# Patient Record
(demographics unavailable — no encounter records)

---

## 2025-02-03 NOTE — HISTORY OF PRESENT ILLNESS
[FreeTextEntry1] : Dear MILADY Collier  Thank you so much for the referral to help care for your patient.  Chief Complaint: Prostate Cancer Date of first visit: 11/07/2022  MARYAN FUCHS is a 74 year old /[place] man with PMHx HLD, HTN, preDM who presents for newly diagnosed prostate cancer. The biopsy was positive for GG2-GG1 cancer. The MRI review demonstrated an area that correlates with the known pathology. However, it was not a discrete lesion. We targeted the area for the prostate biopsy with the UroNav MRI fusion on 5/23/24. The biopsy was POSITIVE for GG1 and GG2 prostate cancer.  His PSA is 7.57 ng/ml. Denies family hx of  malignancies. Maternal grandmother with breast cancer.  Decipher reported on 02/15/2023 - score: 0.30, LOW risk Decipher 0.09 (low) June 2024  PSA Hx: 8.98 06/21/2024 9.60 03/12/2024 7.70 09/12/2023 8.04 11/21/22 7.57 11/07/22 PSAD 8.2 09/23/22 7.03 09/07/22  CT Hx: 8/19/2019 CT ABD-PELV at [facility]. Multiple simple hepatic cysts measuring up to 4.7 cm in size. A 1.3 cm cyst in the uncinate process is incompletely characterized. There is a 2.4 cm left renal cyst. Colonic diverticulosis. Prominent prostate mildly indenting the floor the bladder. There is a 1 cm bladder diverticulum can be associated with chronic outlet obstruction.  MRI Hx: MRI at [facility] on 01/09/2024. 71 cc prostate with PIRADS 2, No new MRI targetable lesions. Previous left midgland PZpl lesion is less conspicuous. No LAD No EPE, No Bony Lesions. The images have been reviewed and clinical implications discussed with the patient. (ADDED RIGHT MID PZPL)  MRI at [facility] on 11/16/2022. 60cc prostate with PIRADS 4 lesion measuring 7x8x9 mm, left mid pzpl (image #19). No LAD No EPE, No Bony Lesions. The images have been reviewed and clinical implications discussed with the patient. On review, lesion seen at R mid TZp (image#19).  Biopsy Hx: 05/23/2024 with - -Right anterior apex: Small focus of atypical glands. -Right anterior base:(McLaughlin Score 3+3=6) involving 30% of biopsy material. -Midline base: High grade prostatic intraepithelial neoplasia (HGPIN). -Left posterior apex: Small focus of atypical glands. -Right apex base PZ: (Edward Score 3+3=6) involving 15% of biopsy material. -Right mid PZPL:(Edward Score 3+4=7) involving 40%, 40% of biopsy material.  1/19/23 with Dr Tan The standard biopsy detected GG2-GG1 cancer 2/12 cores (GG2 RPA 30% 2.5mm 10% pattern 4. GG1 RPB 5% 0.5mm).  02/11/2024 IPSS    QOL ALEXANDER  09/12/2023 IPSS 9 QOL 0 ALEXANDER 18  03/01/2023 IPSS 3 QOL 0 ALEXANDER 22  11/07/2022 IPSS 5 QOL 0 ALEXANDER 20  The patient denies fevers, chills, nausea and or vomiting and no unexplained weight loss.  All pertinent laboratory, films and physician notes were reviewed. Questionnaire results were discussed with patient.  Discussion with the patient: Risks/Benefits/FDA recommendations for prostate cancer screening, the natural history of prostate cancer, the concept of "competing risks", options for treatment including active surveillance, open and laparoscopic (Robotic) radical prostatectomy, external beam radiation therapy, interstitial brachytherapy ("seeds"), combined therapies, hormonal therapies, orchiectomy, and cryotherapy. The potential side effects, early and delayed risks, and effectiveness of each treatment was discussed. The specific details of this patient's disease and their bearing on the above were discussed. The patient was offered the opportunity to meet with the Radiation Oncologists. The patient asked appropriate contextual questions, indicating a good level of understanding.  We discussed the surgical options for management, including robotic and open prostatectomy. The patient understands that the risks of these procedures include bleeding infection, damage to the rectum and surrounding organs, and ED and urinary incontinence, both of which may be persistent. The advantages include removing the entire prostate for more accurate pathologic staging. There is more blood loss with the open approach than with the laparoscopic approach.  We also discussed the options of radiation (external beam, stereotactic body radiation therapy, brachytherapy or combination therapies). Definitive brachytherapy at our institution is done with Pd-103 either as monotherapy or in combination with external beam radiation (Dependent on risk). The patient understands that the risks of radiation include increased LUTS, diarrhea, hematuria, difficulty urinating, ED, and urinary frequency. Based upon the patient's PSA/pathology and risk stratification, the patient may need androgen deprivation in the neoadjuvant and adjuvant setting if he chooses a radiation monotherapy.  We also discussed active surveillance. The patient understands that this is not a treatment but a way of monitoring potentially indolent disease. The patient understands that this has minimal side effects but there is a risk of disease progression.  We have discussed the use of focal therapy and cancer control. The goal be to treat the high-risk area and follow the low-risk areas which are not clinically significant. I describe the difference between whole gland therapy and focal therapy with respect to cancerous control versus cancer cure. We talked about the risks of focal therapy damage to adjacent structures; however, the probability of urinary incontinence and erectile dysfunction is lower.  The patient and I discussed all of these options as well as their risks and benefits, and I believe I answered his questions. Based on the patient's disease characteristics, I feel the patient would be a good candidate for WG radio-therapy The patient decided to see Wilfredo Nicole in [facility] - he really liked her. but is sad he does not get to see her again because he does not want radiation at this time and will continue with AS.

## 2025-02-03 NOTE — PHYSICAL EXAM
[FreeTextEntry1] : 75 yo male with elevated PSA 9.6 ng/ml, no prior imaging, biopsy naive, neg FH, CHRISTIAN with L sided firmness/no discrete nodules. The MRI 2024 I reported additional lesion for sampling - all information in dyancad.  Prostate Cancer (GG2) - T1c, 2 cores(+), no MR correlate, detected on standard biopsy - Right posterior apex (3+4=7, 30% 2.5mm) and right posterior base (3+3=6, 5% 0.5mm) (2023 BIOPSY) 2024 Biopsy - MRI correlate with new lesion reported by MARY ALTAMIRANO with focal GG1 as well.  discussion regarding treatment options consultation with Dr. Mcfarlane. love her but is going with AS DECIPHER 0.09 low risk 6/7/24  [facility] Active Surveillance Protocol  CHRISTIAN Enrollment then Q3 years  PSA Q6 months x5 years THEN annually  MRI Q12-24 months x5 years, then Q3 years  Biopsy Stable Visible Lesion: Biopsy at month 12, 48 then Q3-5 years or For Cause  No Visible Lesion: Biopsy at month 12, then Q3-5 years if MRI remains stable   TRIGGERS for FOR-CAUSE BIOPSY  New nodule on CHRISTIAN  Rising PSA x3 and PSADT <3 years  MRI with lesion progression (PRECISE 4-5)  Increase in PIRADS score or new PIRADS 3-5 lesion  SVI, LVI, KENNETH, LAD  Increase in lesion size   Treatment Consideration  Pathologic progression GG1?GG2 or GG2?GG3  Cancer in >50% of cores  Any GG3 Disease  Anxiety  Non-compliance  Patient preference  Thank you very much for allowing me to assist in the care of this patient. Please do not hesitate to contact me with any additional questions or concerns.

## 2025-02-04 NOTE — HISTORY OF PRESENT ILLNESS
[FreeTextEntry1] : Dear MILADY Collier  Thank you so much for the referral to help care for your patient.  Chief Complaint: Prostate Cancer Date of first visit: 11/07/2022  MARYAN FUCHS is a 74 year old /[place]er man with PMHx HLD, HTN, preDM who presents for newly diagnosed prostate cancer. The biopsy was positive for GG2-GG1 cancer. The MRI review demonstrated an area that correlates with the known pathology. However, it was not a discrete lesion. We targeted the area for the prostate biopsy with the UroNav MRI fusion on 5/23/24. The biopsy was POSITIVE for GG1 and GG2 prostate cancer.  His PSA is 7.57 ng/ml. Denies family hx of  malignancies. Maternal grandmother with breast cancer.  Decipher reported on 02/15/2023 - score: 0.30, LOW risk Decipher 0.09 (low) June 2024  PSA Hx: 8.98 06/21/2024 9.60 03/12/2024 7.70 09/12/2023 8.04 11/21/22 7.57 11/07/22 PSAD 8.2 09/23/22 7.03 09/07/22  CT Hx: 8/19/2019 CT ABD-PELV at [facility]. Multiple simple hepatic cysts measuring up to 4.7 cm in size. A 1.3 cm cyst in the uncinate process is incompletely characterized. There is a 2.4 cm left renal cyst. Colonic diverticulosis. Prominent prostate mildly indenting the floor the bladder. There is a 1 cm bladder diverticulum can be associated with chronic outlet obstruction.  MRI Hx: MRI at [facility] on 01/09/2024. 71 cc prostate with PIRADS 2, No new MRI targetable lesions. Previous left midgland PZpl lesion is less conspicuous. No LAD No EPE, No Bony Lesions. The images have been reviewed and clinical implications discussed with the patient. (ADDED RIGHT MID PZPL)  MRI at [facility] on 11/16/2022. 60cc prostate with PIRADS 4 lesion measuring 7x8x9 mm, left mid pzpl (image #19). No LAD No EPE, No Bony Lesions. The images have been reviewed and clinical implications discussed with the patient. On review, lesion seen at R mid TZp (image#19).  Biopsy Hx: 05/23/2024 with - -Right anterior apex: Small focus of atypical glands. -Right anterior base:(Vincent Score 3+3=6) involving 30% of biopsy material. -Midline base: High grade prostatic intraepithelial neoplasia (HGPIN). -Left posterior apex: Small focus of atypical glands. -Right apex base PZ: (Edward Score 3+3=6) involving 15% of biopsy material. -Right mid PZPL:(Edward Score 3+4=7) involving 40%, 40% of biopsy material.  1/19/23 with Dr Tan The standard biopsy detected GG2-GG1 cancer 2/12 cores (GG2 RPA 30% 2.5mm 10% pattern 4. GG1 RPB 5% 0.5mm).  02/11/2024 IPSS    QOL ALEXANDER  09/12/2023 IPSS 9 QOL 0 ALEXANDER 18  03/01/2023 IPSS 3 QOL 0 ALEXANDER 22  11/07/2022 IPSS 5 QOL 0 ALEXANDER 20  The patient denies fevers, chills, nausea and or vomiting and no unexplained weight loss.  All pertinent laboratory, films and physician notes were reviewed. Questionnaire results were discussed with patient.  Discussion with the patient: Risks/Benefits/FDA recommendations for prostate cancer screening, the natural history of prostate cancer, the concept of "competing risks", options for treatment including active surveillance, open and laparoscopic (Robotic) radical prostatectomy, external beam radiation therapy, interstitial brachytherapy ("seeds"), combined therapies, hormonal therapies, orchiectomy, and cryotherapy. The potential side effects, early and delayed risks, and effectiveness of each treatment was discussed. The specific details of this patient's disease and their bearing on the above were discussed. The patient was offered the opportunity to meet with the Radiation Oncologists. The patient asked appropriate contextual questions, indicating a good level of understanding.  We discussed the surgical options for management, including robotic and open prostatectomy. The patient understands that the risks of these procedures include bleeding infection, damage to the rectum and surrounding organs, and ED and urinary incontinence, both of which may be persistent. The advantages include removing the entire prostate for more accurate pathologic staging. There is more blood loss with the open approach than with the laparoscopic approach.  We also discussed the options of radiation (external beam, stereotactic body radiation therapy, brachytherapy or combination therapies). Definitive brachytherapy at our institution is done with Pd-103 either as monotherapy or in combination with external beam radiation (Dependent on risk). The patient understands that the risks of radiation include increased LUTS, diarrhea, hematuria, difficulty urinating, ED, and urinary frequency. Based upon the patient's PSA/pathology and risk stratification, the patient may need androgen deprivation in the neoadjuvant and adjuvant setting if he chooses a radiation monotherapy.  We also discussed active surveillance. The patient understands that this is not a treatment but a way of monitoring potentially indolent disease. The patient understands that this has minimal side effects but there is a risk of disease progression.  We have discussed the use of focal therapy and cancer control. The goal be to treat the high-risk area and follow the low-risk areas which are not clinically significant. I describe the difference between whole gland therapy and focal therapy with respect to cancerous control versus cancer cure. We talked about the risks of focal therapy damage to adjacent structures; however, the probability of urinary incontinence and erectile dysfunction is lower.  The patient and I discussed all of these options as well as their risks and benefits, and I believe I answered his questions. Based on the patient's disease characteristics, I feel the patient would be a good candidate for WG radio-therapy The patient decided to see Wilfredo Nicole in [facility] - he really liked her. but is sad he does not get to see her again because he does not want radiation at this time and will continue with AS.

## 2025-02-04 NOTE — PHYSICAL EXAM
[FreeTextEntry1] : 73 yo male with elevated PSA 9.6 ng/ml, no prior imaging, biopsy naive, neg FH, CHRISTIAN with L sided firmness/no discrete nodules. The MRI 2024 I reported additional lesion for sampling - all information in dyancad.  Prostate Cancer (GG2) - T1c, 2 cores(+), no MR correlate, detected on standard biopsy - Right posterior apex (3+4=7, 30% 2.5mm) and right posterior base (3+3=6, 5% 0.5mm) (2023 BIOPSY) 2024 Biopsy - MRI correlate with new lesion reported by MARY ALTAMIRANO with focal GG1 as well.  discussion regarding treatment options consultation with Dr. Mcfarlane. love her but is going with AS DECIPHER 0.09 low risk 6/7/24  [facility] Active Surveillance Protocol  CHRISTIAN Enrollment then Q3 years  PSA Q6 months x5 years THEN annually  MRI Q12-24 months x5 years, then Q3 years  Biopsy Stable Visible Lesion: Biopsy at month 12, 48 then Q3-5 years or For Cause  No Visible Lesion: Biopsy at month 12, then Q3-5 years if MRI remains stable   TRIGGERS for FOR-CAUSE BIOPSY  New nodule on CHRISTIAN  Rising PSA x3 and PSADT <3 years  MRI with lesion progression (PRECISE 4-5)  Increase in PIRADS score or new PIRADS 3-5 lesion  SVI, LVI, KENNETH, LAD  Increase in lesion size   Treatment Consideration  Pathologic progression GG1?GG2 or GG2?GG3  Cancer in >50% of cores  Any GG3 Disease  Anxiety  Non-compliance  Patient preference  Thank you very much for allowing me to assist in the care of this patient. Please do not hesitate to contact me with any additional questions or concerns.

## 2025-02-04 NOTE — HISTORY OF PRESENT ILLNESS
[FreeTextEntry1] : Dear MILADY Collier  Thank you so much for the referral to help care for your patient.  Chief Complaint: Prostate Cancer Date of first visit: 11/07/2022  MARYAN FUCHS is a 74 year old /[place] man with PMHx HLD, HTN, preDM who presents for newly diagnosed prostate cancer. The biopsy was positive for GG2-GG1 cancer. The MRI review demonstrated an area that correlates with the known pathology. However, it was not a discrete lesion. We targeted the area for the prostate biopsy with the UroNav MRI fusion on 5/23/24. The biopsy was POSITIVE for GG1 and GG2 prostate cancer.  His PSA is 7.57 ng/ml. Denies family hx of  malignancies. Maternal grandmother with breast cancer.  Decipher reported on 02/15/2023 - score: 0.30, LOW risk Decipher 0.09 (low) June 2024  PSA Hx: 8.98 06/21/2024 9.60 03/12/2024 7.70 09/12/2023 8.04 11/21/22 7.57 11/07/22 PSAD 8.2 09/23/22 7.03 09/07/22  CT Hx: 8/19/2019 CT ABD-PELV at [facility]. Multiple simple hepatic cysts measuring up to 4.7 cm in size. A 1.3 cm cyst in the uncinate process is incompletely characterized. There is a 2.4 cm left renal cyst. Colonic diverticulosis. Prominent prostate mildly indenting the floor the bladder. There is a 1 cm bladder diverticulum can be associated with chronic outlet obstruction.  MRI Hx: MRI at [facility] on 01/09/2024. 71 cc prostate with PIRADS 2, No new MRI targetable lesions. Previous left midgland PZpl lesion is less conspicuous. No LAD No EPE, No Bony Lesions. The images have been reviewed and clinical implications discussed with the patient. (ADDED RIGHT MID PZPL)  MRI at [place] on 11/16/2022. 60cc prostate with PIRADS 4 lesion measuring 7x8x9 mm, left mid pzpl (image #19). No LAD No EPE, No Bony Lesions. The images have been reviewed and clinical implications discussed with the patient. On review, lesion seen at R mid TZp (image#19).  Biopsy Hx: 05/23/2024 with - -Right anterior apex: Small focus of atypical glands. -Right anterior base:(Clayton Score 3+3=6) involving 30% of biopsy material. -Midline base: High grade prostatic intraepithelial neoplasia (HGPIN). -Left posterior apex: Small focus of atypical glands. -Right apex base PZ: (Edward Score 3+3=6) involving 15% of biopsy material. -Right mid PZPL:(Edward Score 3+4=7) involving 40%, 40% of biopsy material.  1/19/23 with Dr Tan The standard biopsy detected GG2-GG1 cancer 2/12 cores (GG2 RPA 30% 2.5mm 10% pattern 4. GG1 RPB 5% 0.5mm).  02/11/2024 IPSS    QOL ALEXANDER  09/12/2023 IPSS 9 QOL 0 ALEXANDER 18  03/01/2023 IPSS 3 QOL 0 ALEXANDER 22  11/07/2022 IPSS 5 QOL 0 ALEXANDER 20  The patient denies fevers, chills, nausea and or vomiting and no unexplained weight loss.  All pertinent laboratory, films and physician notes were reviewed. Questionnaire results were discussed with patient.  Discussion with the patient: Risks/Benefits/FDA recommendations for prostate cancer screening, the natural history of prostate cancer, the concept of "competing risks", options for treatment including active surveillance, open and laparoscopic (Robotic) radical prostatectomy, external beam radiation therapy, interstitial brachytherapy ("seeds"), combined therapies, hormonal therapies, orchiectomy, and cryotherapy. The potential side effects, early and delayed risks, and effectiveness of each treatment was discussed. The specific details of this patient's disease and their bearing on the above were discussed. The patient was offered the opportunity to meet with the Radiation Oncologists. The patient asked appropriate contextual questions, indicating a good level of understanding.  We discussed the surgical options for management, including robotic and open prostatectomy. The patient understands that the risks of these procedures include bleeding infection, damage to the rectum and surrounding organs, and ED and urinary incontinence, both of which may be persistent. The advantages include removing the entire prostate for more accurate pathologic staging. There is more blood loss with the open approach than with the laparoscopic approach.  We also discussed the options of radiation (external beam, stereotactic body radiation therapy, brachytherapy or combination therapies). Definitive brachytherapy at our institution is done with Pd-103 either as monotherapy or in combination with external beam radiation (Dependent on risk). The patient understands that the risks of radiation include increased LUTS, diarrhea, hematuria, difficulty urinating, ED, and urinary frequency. Based upon the patient's PSA/pathology and risk stratification, the patient may need androgen deprivation in the neoadjuvant and adjuvant setting if he chooses a radiation monotherapy.  We also discussed active surveillance. The patient understands that this is not a treatment but a way of monitoring potentially indolent disease. The patient understands that this has minimal side effects but there is a risk of disease progression.  We have discussed the use of focal therapy and cancer control. The goal be to treat the high-risk area and follow the low-risk areas which are not clinically significant. I describe the difference between whole gland therapy and focal therapy with respect to cancerous control versus cancer cure. We talked about the risks of focal therapy damage to adjacent structures; however, the probability of urinary incontinence and erectile dysfunction is lower.  The patient and I discussed all of these options as well as their risks and benefits, and I believe I answered his questions. Based on the patient's disease characteristics, I feel the patient would be a good candidate for WG radio-therapy The patient decided to see Wilfredo Nicole in [facility] - he really liked her. but is sad he does not get to see her again because he does not want radiation at this time and will continue with AS.

## 2025-02-04 NOTE — PHYSICAL EXAM
[FreeTextEntry1] : 75 yo male with elevated PSA 9.6 ng/ml, no prior imaging, biopsy naive, neg FH, CHRISTIAN with L sided firmness/no discrete nodules. The MRI 2024 I reported additional lesion for sampling - all information in dyancad.  Prostate Cancer (GG2) - T1c, 2 cores(+), no MR correlate, detected on standard biopsy - Right posterior apex (3+4=7, 30% 2.5mm) and right posterior base (3+3=6, 5% 0.5mm) (2023 BIOPSY) 2024 Biopsy - MRI correlate with new lesion reported by MARY ALTAMIRANO with focal GG1 as well.  discussion regarding treatment options consultation with Dr. Mcfarlane. love her but is going with AS DECIPHER 0.09 low risk 6/7/24  [facility] Active Surveillance Protocol  CHRISTIAN Enrollment then Q3 years  PSA Q6 months x5 years THEN annually  MRI Q12-24 months x5 years, then Q3 years  Biopsy Stable Visible Lesion: Biopsy at month 12, 48 then Q3-5 years or For Cause  No Visible Lesion: Biopsy at month 12, then Q3-5 years if MRI remains stable   TRIGGERS for FOR-CAUSE BIOPSY  New nodule on CHRISTIAN  Rising PSA x3 and PSADT <3 years  MRI with lesion progression (PRECISE 4-5)  Increase in PIRADS score or new PIRADS 3-5 lesion  SVI, LVI, KENNETH, LAD  Increase in lesion size   Treatment Consideration  Pathologic progression GG1?GG2 or GG2?GG3  Cancer in >50% of cores  Any GG3 Disease  Anxiety  Non-compliance  Patient preference  Thank you very much for allowing me to assist in the care of this patient. Please do not hesitate to contact me with any additional questions or concerns.